# Patient Record
Sex: FEMALE | Race: OTHER | NOT HISPANIC OR LATINO | URBAN - METROPOLITAN AREA
[De-identification: names, ages, dates, MRNs, and addresses within clinical notes are randomized per-mention and may not be internally consistent; named-entity substitution may affect disease eponyms.]

---

## 2022-08-03 NOTE — ASU PATIENT PROFILE, ADULT - NSICDXPASTMEDICALHX_GEN_ALL_CORE_FT
PAST MEDICAL HISTORY:  Anemia     GERD (gastroesophageal reflux disease)     Hyperlipidemia     Hypertension

## 2022-08-03 NOTE — ASU PATIENT PROFILE, ADULT - FALL HARM RISK - UNIVERSAL INTERVENTIONS
Bed in lowest position, wheels locked, appropriate side rails in place/Call bell, personal items and telephone in reach/Instruct patient to call for assistance before getting out of bed or chair/Non-slip footwear when patient is out of bed/[place] to call system/Physically safe environment - no spills, clutter or unnecessary equipment/Purposeful Proactive Rounding/Room/bathroom lighting operational, light cord in reach

## 2022-08-04 ENCOUNTER — OUTPATIENT (OUTPATIENT)
Dept: OUTPATIENT SERVICES | Facility: HOSPITAL | Age: 67
LOS: 1 days | Discharge: HOME | End: 2022-08-04

## 2022-08-04 VITALS
SYSTOLIC BLOOD PRESSURE: 123 MMHG | HEART RATE: 64 BPM | RESPIRATION RATE: 16 BRPM | TEMPERATURE: 98 F | DIASTOLIC BLOOD PRESSURE: 59 MMHG | OXYGEN SATURATION: 99 %

## 2022-08-04 VITALS
TEMPERATURE: 97 F | DIASTOLIC BLOOD PRESSURE: 76 MMHG | WEIGHT: 181.22 LBS | RESPIRATION RATE: 16 BRPM | HEART RATE: 71 BPM | HEIGHT: 65 IN | OXYGEN SATURATION: 97 % | SYSTOLIC BLOOD PRESSURE: 121 MMHG

## 2022-08-04 RX ORDER — AMLODIPINE BESYLATE 2.5 MG/1
1 TABLET ORAL
Qty: 0 | Refills: 0 | DISCHARGE

## 2022-08-04 RX ORDER — ACETAMINOPHEN 500 MG
650 TABLET ORAL EVERY 6 HOURS
Refills: 0 | Status: DISCONTINUED | OUTPATIENT
Start: 2022-08-04 | End: 2022-08-04

## 2022-08-04 RX ORDER — ACETAMINOPHEN 500 MG
650 TABLET ORAL ONCE
Refills: 0 | Status: DISCONTINUED | OUTPATIENT
Start: 2022-08-04 | End: 2022-08-04

## 2022-08-04 RX ORDER — PANTOPRAZOLE SODIUM 20 MG/1
1 TABLET, DELAYED RELEASE ORAL
Qty: 0 | Refills: 0 | DISCHARGE

## 2022-08-04 NOTE — OPERATIVE REPORT - OPERATIVE RPOSRT DETAILS
Date of Procedure: 08/04/2022    Surgeon:  Wan Villagran    Pre-Op Diagnosis: Dermatochalasis-Both Upper Eyelids      POSTOPERATIVE DIAGNOSIS(ES): Dermatochalasis-Both Upper Eyelids    Anesthesia:  MAC    OPERATION: Blepharoplasty of both upper eyelids.     COMPLICATIONS: NONE    DESCRIPTION OF PROCEDURE: The patient was brought to the operating room and prep and drape was done in the usual manner. Under mild sedation, first of all, the marking pen was used to identify the lid crease of both upper eyelids and then the injection of 2% Xylocaine with epinephrine and 0.75 Marcaine in half-and-half solution was injected at both supraorbital nerves and then injection of both upper eyelids and the middle portion of both lower eyelids. Approaching the lepharoplasty procedure first for both upper eyelids, the right upper lid was started first.     Incision was made at the lid crease of the right upper lid using 15-blade. The skin and orbicularis were then  with forceps and Srinivas scissors, and the orbital septum was exposed. The orbital septum was opened and excess orbital fat was removed in an open kimber fashion. Bleeders cauterized using Bovie.  The skin was then undermined superiorly and inferiorly, and three cardinal sutures with 6-0 Vicryl placed over the incision. A wet sponge was placed over the right upper lid and then the left upper lid was approached.     Next in the same fashion, incision was made using 15-blade at the lid crease of the left upper lid and until the orbital septum was reached. The orbital septum   opened with forceps and scissors. External orbital fat was removed in an open scar fashion and the bleeders cauterized using Bovie. The skin was undermined  superiorly and inferiorly, and three cardinal sutures using 6-0 Vicryl was placed over the incision. Another wet sponge was placed over the left upper lid and returned back to the right upper lid. The skin and orbicularis were closed with a running 6-0 nylon and then the same procedure performed to close the wound of the left upper lid with a running 6-0 nylon.

## 2022-08-04 NOTE — ASU PREOP CHECKLIST - ORDERS/MEDICATION ADMINISTRATION RECORD ON CHART
done Olumiant Counseling: I discussed with the patient the risks of Olumiant therapy including but not limited to upper respiratory tract infections, shingles, cold sores, and nausea. Live vaccines should be avoided.  This medication has been linked to serious infections; higher rate of mortality; malignancy and lymphoproliferative disorders; major adverse cardiovascular events; thrombosis; gastrointestinal perforations; neutropenia; lymphopenia; anemia; liver enzyme elevations; and lipid elevations.

## (undated) DEVICE — GLV 8 PROTEXIS (WHITE)

## (undated) DEVICE — PACK BLEPHAROPLASTY

## (undated) DEVICE — APPLICATOR COTTON TIP 6"

## (undated) DEVICE — PREP BETADINE 5% STERILE OPTHALMIC SOLUTION

## (undated) DEVICE — SYR LUER LOK 5CC

## (undated) DEVICE — WARMING BLANKET LOWER ADULT

## (undated) DEVICE — NDL HYPO SAFE 25G X 1.5" (ORANGE)